# Patient Record
Sex: FEMALE | Race: BLACK OR AFRICAN AMERICAN | Employment: FULL TIME | URBAN - METROPOLITAN AREA
[De-identification: names, ages, dates, MRNs, and addresses within clinical notes are randomized per-mention and may not be internally consistent; named-entity substitution may affect disease eponyms.]

---

## 2018-09-26 ENCOUNTER — HOSPITAL ENCOUNTER (OUTPATIENT)
Dept: MRI IMAGING | Age: 33
Discharge: HOME | End: 2018-09-26
Attending: FAMILY MEDICINE
Payer: COMMERCIAL

## 2018-09-26 ENCOUNTER — HOSPITAL ENCOUNTER (OUTPATIENT)
Dept: GENERAL RADIOLOGY | Age: 33
Discharge: HOME | End: 2018-09-26
Attending: FAMILY MEDICINE
Payer: COMMERCIAL

## 2018-09-26 DIAGNOSIS — M24.111 LABRAL TEAR OF SHOULDER, DEGENERATIVE, RIGHT: ICD-10-CM

## 2018-09-26 DIAGNOSIS — S43.431A LABRAL TEAR OF SHOULDER, RIGHT, INITIAL ENCOUNTER: ICD-10-CM

## 2018-09-26 PROCEDURE — A9575 INJ GADOTERATE MEGLUMI 0.1ML: HCPCS | Performed by: RADIOLOGY

## 2018-09-26 PROCEDURE — 74011000250 HC RX REV CODE- 250: Performed by: RADIOLOGY

## 2018-09-26 PROCEDURE — 73222 MRI JOINT UPR EXTREM W/DYE: CPT

## 2018-09-26 PROCEDURE — 74011250636 HC RX REV CODE- 250/636: Performed by: RADIOLOGY

## 2018-09-26 PROCEDURE — 74011636320 HC RX REV CODE- 636/320: Performed by: RADIOLOGY

## 2018-09-26 PROCEDURE — 77030014113 XR ARTHRO SHOULDER RT

## 2018-09-26 RX ORDER — GADOTERATE MEGLUMINE 376.9 MG/ML
0.1 INJECTION INTRAVENOUS
Status: COMPLETED | OUTPATIENT
Start: 2018-09-26 | End: 2018-09-26

## 2018-09-26 RX ORDER — LIDOCAINE HYDROCHLORIDE 10 MG/ML
3 INJECTION INFILTRATION; PERINEURAL
Status: COMPLETED | OUTPATIENT
Start: 2018-09-26 | End: 2018-09-26

## 2018-09-26 RX ADMIN — LIDOCAINE HYDROCHLORIDE 8 ML: 10 INJECTION, SOLUTION INFILTRATION; PERINEURAL at 11:44

## 2018-09-26 RX ADMIN — GADOTERATE MEGLUMINE 0.1 ML: 376.9 INJECTION INTRAVENOUS at 11:45

## 2018-09-26 RX ADMIN — IOHEXOL 4 ML: 300 INJECTION, SOLUTION INTRAVENOUS at 11:44

## 2018-09-26 RX ADMIN — SODIUM BICARBONATE 1 ML: 0.2 INJECTION, SOLUTION INTRAVENOUS at 11:47

## 2022-07-30 ENCOUNTER — APPOINTMENT (OUTPATIENT)
Dept: ULTRASOUND IMAGING | Age: 37
End: 2022-07-30
Attending: STUDENT IN AN ORGANIZED HEALTH CARE EDUCATION/TRAINING PROGRAM
Payer: COMMERCIAL

## 2022-07-30 ENCOUNTER — HOSPITAL ENCOUNTER (EMERGENCY)
Age: 37
Discharge: HOME | End: 2022-07-30
Attending: STUDENT IN AN ORGANIZED HEALTH CARE EDUCATION/TRAINING PROGRAM
Payer: COMMERCIAL

## 2022-07-30 VITALS
DIASTOLIC BLOOD PRESSURE: 78 MMHG | TEMPERATURE: 98.8 F | HEART RATE: 64 BPM | SYSTOLIC BLOOD PRESSURE: 117 MMHG | RESPIRATION RATE: 18 BRPM | OXYGEN SATURATION: 98 %

## 2022-07-30 DIAGNOSIS — O20.0 THREATENED MISCARRIAGE: Primary | ICD-10-CM

## 2022-07-30 DIAGNOSIS — N30.00 ACUTE CYSTITIS WITHOUT HEMATURIA: ICD-10-CM

## 2022-07-30 LAB
ABO + RH BLD: NORMAL
ALBUMIN SERPL-MCNC: 3.8 G/DL (ref 3.5–5)
ALBUMIN/GLOB SERPL: 1.3 {RATIO} (ref 1.1–2.2)
ALP SERPL-CCNC: 67 U/L (ref 45–117)
ALT SERPL-CCNC: 22 U/L (ref 12–78)
ANION GAP SERPL CALC-SCNC: 4 MMOL/L (ref 5–15)
APPEARANCE UR: ABNORMAL
AST SERPL-CCNC: 10 U/L (ref 15–37)
BACTERIA URNS QL MICRO: ABNORMAL /HPF
BASOPHILS # BLD: 0 K/UL (ref 0–0.1)
BASOPHILS NFR BLD: 0 % (ref 0–1)
BILIRUB SERPL-MCNC: 0.4 MG/DL (ref 0.2–1)
BILIRUB UR QL: NEGATIVE
BLOOD BANK CMNT PATIENT-IMP: NORMAL
BUN SERPL-MCNC: 6 MG/DL (ref 6–20)
BUN/CREAT SERPL: 10 (ref 12–20)
CALCIUM SERPL-MCNC: 9.2 MG/DL (ref 8.5–10.1)
CHLORIDE SERPL-SCNC: 108 MMOL/L (ref 97–108)
CO2 SERPL-SCNC: 28 MMOL/L (ref 21–32)
COLOR UR: ABNORMAL
COMMENT, HOLDF: NORMAL
CREAT SERPL-MCNC: 0.6 MG/DL (ref 0.55–1.02)
DIFFERENTIAL METHOD BLD: NORMAL
EOSINOPHIL # BLD: 0.1 K/UL (ref 0–0.4)
EOSINOPHIL NFR BLD: 1 % (ref 0–7)
EPITH CASTS URNS QL MICRO: ABNORMAL /LPF
ERYTHROCYTE [DISTWIDTH] IN BLOOD BY AUTOMATED COUNT: 11.8 % (ref 11.5–14.5)
GLOBULIN SER CALC-MCNC: 3 G/DL (ref 2–4)
GLUCOSE SERPL-MCNC: 95 MG/DL (ref 65–100)
GLUCOSE UR STRIP.AUTO-MCNC: NEGATIVE MG/DL
HCG SERPL-ACNC: 934 MIU/ML (ref 0–6)
HCT VFR BLD AUTO: 35 % (ref 35–47)
HGB BLD-MCNC: 12.5 G/DL (ref 11.5–16)
HGB UR QL STRIP: ABNORMAL
HYALINE CASTS URNS QL MICRO: ABNORMAL /LPF (ref 0–5)
IMM GRANULOCYTES # BLD AUTO: 0 K/UL (ref 0–0.04)
IMM GRANULOCYTES NFR BLD AUTO: 0 % (ref 0–0.5)
KETONES UR QL STRIP.AUTO: NEGATIVE MG/DL
LEUKOCYTE ESTERASE UR QL STRIP.AUTO: ABNORMAL
LYMPHOCYTES # BLD: 2.1 K/UL (ref 0.8–3.5)
LYMPHOCYTES NFR BLD: 27 % (ref 12–49)
MCH RBC QN AUTO: 32.3 PG (ref 26–34)
MCHC RBC AUTO-ENTMCNC: 35.7 G/DL (ref 30–36.5)
MCV RBC AUTO: 90.4 FL (ref 80–99)
MONOCYTES # BLD: 0.7 K/UL (ref 0–1)
MONOCYTES NFR BLD: 8 % (ref 5–13)
NEUTS SEG # BLD: 5 K/UL (ref 1.8–8)
NEUTS SEG NFR BLD: 64 % (ref 32–75)
NITRITE UR QL STRIP.AUTO: POSITIVE
NRBC # BLD: 0 K/UL (ref 0–0.01)
NRBC BLD-RTO: 0 PER 100 WBC
PH UR STRIP: 7.5 [PH] (ref 5–8)
PLATELET # BLD AUTO: 203 K/UL (ref 150–400)
PMV BLD AUTO: 9.2 FL (ref 8.9–12.9)
POTASSIUM SERPL-SCNC: 3.9 MMOL/L (ref 3.5–5.1)
PROT SERPL-MCNC: 6.8 G/DL (ref 6.4–8.2)
PROT UR STRIP-MCNC: NEGATIVE MG/DL
RBC # BLD AUTO: 3.87 M/UL (ref 3.8–5.2)
RBC #/AREA URNS HPF: ABNORMAL /HPF (ref 0–5)
SAMPLES BEING HELD,HOLD: NORMAL
SODIUM SERPL-SCNC: 140 MMOL/L (ref 136–145)
SP GR UR REFRACTOMETRY: 1.01 (ref 1–1.03)
UR CULT HOLD, URHOLD: NORMAL
UROBILINOGEN UR QL STRIP.AUTO: 0.2 EU/DL (ref 0.2–1)
WBC # BLD AUTO: 7.8 K/UL (ref 3.6–11)
WBC URNS QL MICRO: ABNORMAL /HPF (ref 0–4)

## 2022-07-30 PROCEDURE — 36415 COLL VENOUS BLD VENIPUNCTURE: CPT

## 2022-07-30 PROCEDURE — 76817 TRANSVAGINAL US OBSTETRIC: CPT

## 2022-07-30 PROCEDURE — 99284 EMERGENCY DEPT VISIT MOD MDM: CPT

## 2022-07-30 PROCEDURE — 81001 URINALYSIS AUTO W/SCOPE: CPT

## 2022-07-30 PROCEDURE — 76801 OB US < 14 WKS SINGLE FETUS: CPT

## 2022-07-30 PROCEDURE — 86900 BLOOD TYPING SEROLOGIC ABO: CPT

## 2022-07-30 PROCEDURE — 84702 CHORIONIC GONADOTROPIN TEST: CPT

## 2022-07-30 PROCEDURE — 85025 COMPLETE CBC W/AUTO DIFF WBC: CPT

## 2022-07-30 PROCEDURE — 80053 COMPREHEN METABOLIC PANEL: CPT

## 2022-07-30 RX ORDER — CEPHALEXIN 500 MG/1
500 CAPSULE ORAL 2 TIMES DAILY
Qty: 14 CAPSULE | Refills: 0 | Status: SHIPPED | OUTPATIENT
Start: 2022-07-30 | End: 2022-08-06

## 2022-07-30 RX ORDER — CEPHALEXIN 500 MG/1
500 CAPSULE ORAL 2 TIMES DAILY
Qty: 14 CAPSULE | Refills: 0 | Status: SHIPPED | OUTPATIENT
Start: 2022-07-30 | End: 2022-07-30

## 2022-07-30 NOTE — ED PROVIDER NOTES
Patient is a 40year old female who is G3,P2 who presents to ED c/o vaginal bleeding which started yesterday. Patient reports she was initially seen by her OBGYN, Dr. Weyman Mortimer, on  due to right sided pelvic pain. Her b-hcg was 1141 on . Repeat b-hcg on  1117. Patient developed vaginal bleeding starting yesterday. She was referred to ED by OBGYN to r/o ectopic pregnancy. She denies any abdominal pain, nausea, vomiting, dysuria, urinary frequency, urgency and back pain. Past Medical History:   Diagnosis Date    Abnormal Pap smear     Had bacterial infection, Normal Paps since    Sickle-cell disease, unspecified     Pt has trait, FOB negative       No past surgical history on file. Family History:   Problem Relation Age of Onset    Cancer Paternal Aunt         Breast CA    Heart Disease Maternal Grandfather          from [place]       Social History     Socioeconomic History    Marital status: SINGLE     Spouse name: Not on file    Number of children: Not on file    Years of education: Not on file    Highest education level: Not on file   Occupational History    Not on file   Tobacco Use    Smoking status: Not on file    Smokeless tobacco: Never   Substance and Sexual Activity    Alcohol use: No    Drug use: No    Sexual activity: Not on file   Other Topics Concern    Not on file   Social History Narrative    Not on file     Social Determinants of Health     Financial Resource Strain: Not on file   Food Insecurity: Not on file   Transportation Needs: Not on file   Physical Activity: Not on file   Stress: Not on file   Social Connections: Not on file   Intimate Partner Violence: Not on file   Housing Stability: Not on file         ALLERGIES: Patient has no known allergies. Review of Systems   Constitutional:  Negative for chills and fever. Gastrointestinal:  Negative for abdominal pain, nausea and vomiting. Genitourinary:  Positive for vaginal bleeding.  Negative for difficulty urinating, flank pain, frequency, pelvic pain and urgency. All other systems reviewed and are negative. Vitals:    07/30/22 1126   BP: 117/78   Pulse: 64   Resp: 18   Temp: 98.8 °F (37.1 °C)   SpO2: 98%            Physical Exam  Vitals and nursing note reviewed. Constitutional:       General: She is not in acute distress. Appearance: Normal appearance. She is well-developed. She is not toxic-appearing. HENT:      Head: Normocephalic and atraumatic. Nose: Nose normal.      Mouth/Throat:      Mouth: Mucous membranes are moist.   Eyes:      General: Lids are normal.      Extraocular Movements: Extraocular movements intact. Conjunctiva/sclera: Conjunctivae normal.   Cardiovascular:      Rate and Rhythm: Normal rate and regular rhythm. Pulses: Normal pulses. Heart sounds: Normal heart sounds, S1 normal and S2 normal.   Pulmonary:      Effort: Pulmonary effort is normal. No accessory muscle usage. Breath sounds: Normal breath sounds. Abdominal:      General: Bowel sounds are normal.      Palpations: Abdomen is soft. Tenderness: There is no abdominal tenderness. There is no right CVA tenderness, left CVA tenderness, guarding or rebound. Genitourinary:     Comments: Exam deferred   Musculoskeletal:         General: Normal range of motion. Cervical back: Normal range of motion and neck supple. Skin:     General: Skin is warm and dry. Capillary Refill: Capillary refill takes less than 2 seconds. Neurological:      General: No focal deficit present. Mental Status: She is alert and oriented to person, place, and time. Mental status is at baseline. Psychiatric:         Attention and Perception: Attention normal.         Mood and Affect: Mood and affect normal.         Speech: Speech normal.         Behavior: Behavior is cooperative. Thought Content:  Thought content normal.         Cognition and Memory: Cognition normal.         Judgment: Judgment normal. MDM  Number of Diagnoses or Management Options  Acute cystitis without hematuria  Threatened miscarriage  Diagnosis management comments: Patient with downtrending b-hcg. 7/27 1141, 7/29 1117, 7/30 934. Ultrasound shows probable gestational sac within the endometrial canal, too small for dating. No visualized fetal pole. Blood type O+, rhogam not indicated. Advised follow-up with OBGYN. UA shows leukocyte esterase, positive nitrites, 20-50 WBCs consistent with UTI. Urine culture sent and will treat with keflex. Return to ER warnings discussed in detail with patient. All questions addressed and answered. Amount and/or Complexity of Data Reviewed  Clinical lab tests: reviewed  Tests in the radiology section of CPT®: reviewed  Discuss the patient with other providers: yes (Dr. Korin Meng, ED Attending )      ED Course as of 07/30/22 1805   Sat Jul 30, 2022   1254 TVUS: IMPRESSION  Probable small gestational sac within the endometrial canal, too  small for dating. No visualized fetal pole, likely secondary to early  gestational age. Dominant 2.2 cm right ovarian cyst is likely physiologic. No  pelvic free fluid.  [KG]      ED Course User Index  [KG] Janet Quinonez

## 2022-07-30 NOTE — ED TRIAGE NOTES
Pt arrives for brown vaginal discharge, right side pelvic pain, decreasing serum hcg levels and malodorous urine. Pt was referred by [place] for ultrasound to r/o ectopic pregnancy. .

## 2024-07-10 ENCOUNTER — HOSPITAL ENCOUNTER (EMERGENCY)
Facility: HOSPITAL | Age: 39
Discharge: HOME | End: 2024-07-11
Attending: STUDENT IN AN ORGANIZED HEALTH CARE EDUCATION/TRAINING PROGRAM
Payer: COMMERCIAL

## 2024-07-10 DIAGNOSIS — K29.00 ACUTE GASTRITIS WITHOUT HEMORRHAGE, UNSPECIFIED GASTRITIS TYPE: ICD-10-CM

## 2024-07-10 DIAGNOSIS — R10.13 EPIGASTRIC PAIN: Primary | ICD-10-CM

## 2024-07-10 LAB — HCG UR QL: NEGATIVE

## 2024-07-10 PROCEDURE — 81025 URINE PREGNANCY TEST: CPT

## 2024-07-10 PROCEDURE — 99285 EMERGENCY DEPT VISIT HI MDM: CPT

## 2024-07-10 PROCEDURE — 81001 URINALYSIS AUTO W/SCOPE: CPT

## 2024-07-10 RX ORDER — ASCORBIC ACID 500 MG
500 TABLET ORAL DAILY
COMMUNITY

## 2024-07-10 RX ORDER — ZINC SULFATE 50(220)MG
50 CAPSULE ORAL DAILY
COMMUNITY

## 2024-07-10 ASSESSMENT — PAIN SCALES - GENERAL: PAINLEVEL_OUTOF10: 8

## 2024-07-10 ASSESSMENT — PAIN DESCRIPTION - LOCATION: LOCATION: ABDOMEN

## 2024-07-10 ASSESSMENT — PAIN - FUNCTIONAL ASSESSMENT: PAIN_FUNCTIONAL_ASSESSMENT: 0-10

## 2024-07-10 ASSESSMENT — LIFESTYLE VARIABLES
HOW OFTEN DO YOU HAVE A DRINK CONTAINING ALCOHOL: NEVER
HOW MANY STANDARD DRINKS CONTAINING ALCOHOL DO YOU HAVE ON A TYPICAL DAY: PATIENT DOES NOT DRINK

## 2024-07-11 ENCOUNTER — APPOINTMENT (OUTPATIENT)
Facility: HOSPITAL | Age: 39
End: 2024-07-11
Payer: COMMERCIAL

## 2024-07-11 VITALS
TEMPERATURE: 97.9 F | BODY MASS INDEX: 23.6 KG/M2 | DIASTOLIC BLOOD PRESSURE: 78 MMHG | HEART RATE: 60 BPM | OXYGEN SATURATION: 100 % | RESPIRATION RATE: 17 BRPM | HEIGHT: 61 IN | WEIGHT: 125 LBS | SYSTOLIC BLOOD PRESSURE: 107 MMHG

## 2024-07-11 LAB
ALBUMIN SERPL-MCNC: 4.1 G/DL (ref 3.5–5)
ALBUMIN/GLOB SERPL: 1.5 (ref 1.1–2.2)
ALP SERPL-CCNC: 81 U/L (ref 45–117)
ALT SERPL-CCNC: 35 U/L (ref 12–78)
ANION GAP SERPL CALC-SCNC: 8 MMOL/L (ref 5–15)
APPEARANCE UR: CLEAR
AST SERPL W P-5'-P-CCNC: 37 U/L (ref 15–37)
BACTERIA URNS QL MICRO: NEGATIVE /HPF
BASOPHILS # BLD: 0.1 K/UL (ref 0–0.1)
BASOPHILS NFR BLD: 1 % (ref 0–1)
BILIRUB SERPL-MCNC: 0.4 MG/DL (ref 0.2–1)
BILIRUB UR QL: NEGATIVE
BUN SERPL-MCNC: 7 MG/DL (ref 6–20)
BUN/CREAT SERPL: 9 (ref 12–20)
CA-I BLD-MCNC: 9.1 MG/DL (ref 8.5–10.1)
CHLORIDE SERPL-SCNC: 105 MMOL/L (ref 97–108)
CO2 SERPL-SCNC: 27 MMOL/L (ref 21–32)
COLOR UR: YELLOW
CREAT SERPL-MCNC: 0.81 MG/DL (ref 0.55–1.02)
DIFFERENTIAL METHOD BLD: ABNORMAL
EOSINOPHIL # BLD: 0.1 K/UL (ref 0–0.4)
EOSINOPHIL NFR BLD: 1 % (ref 0–7)
EPITH CASTS URNS QL MICRO: ABNORMAL /LPF
ERYTHROCYTE [DISTWIDTH] IN BLOOD BY AUTOMATED COUNT: 11.2 % (ref 11.5–14.5)
GLOBULIN SER CALC-MCNC: 2.7 G/DL (ref 2–4)
GLUCOSE SERPL-MCNC: 86 MG/DL (ref 65–100)
GLUCOSE UR STRIP.AUTO-MCNC: NEGATIVE MG/DL
HCT VFR BLD AUTO: 36.7 % (ref 35–47)
HGB BLD-MCNC: 13.1 G/DL (ref 11.5–16)
HGB UR QL STRIP: ABNORMAL
IMM GRANULOCYTES # BLD AUTO: 0 K/UL (ref 0–0.04)
IMM GRANULOCYTES NFR BLD AUTO: 0 % (ref 0–0.5)
KETONES UR QL STRIP.AUTO: NEGATIVE MG/DL
LEUKOCYTE ESTERASE UR QL STRIP.AUTO: NEGATIVE
LIPASE SERPL-CCNC: 26 U/L (ref 13–75)
LYMPHOCYTES # BLD: 3.7 K/UL (ref 0.8–3.5)
LYMPHOCYTES NFR BLD: 37 % (ref 12–49)
MCH RBC QN AUTO: 32.1 PG (ref 26–34)
MCHC RBC AUTO-ENTMCNC: 35.7 G/DL (ref 30–36.5)
MCV RBC AUTO: 90 FL (ref 80–99)
MONOCYTES # BLD: 0.9 K/UL (ref 0–1)
MONOCYTES NFR BLD: 9 % (ref 5–13)
NEUTS SEG # BLD: 5.2 K/UL (ref 1.8–8)
NEUTS SEG NFR BLD: 52 % (ref 32–75)
NITRITE UR QL STRIP.AUTO: NEGATIVE
PH UR STRIP: 6.5 (ref 5–8)
PLATELET # BLD AUTO: 205 K/UL (ref 150–400)
PMV BLD AUTO: 9.3 FL (ref 8.9–12.9)
POTASSIUM SERPL-SCNC: 3.3 MMOL/L (ref 3.5–5.1)
PROT SERPL-MCNC: 6.8 G/DL (ref 6.4–8.2)
PROT UR STRIP-MCNC: NEGATIVE MG/DL
RBC # BLD AUTO: 4.08 M/UL (ref 3.8–5.2)
RBC #/AREA URNS HPF: ABNORMAL /HPF (ref 0–5)
SODIUM SERPL-SCNC: 140 MMOL/L (ref 136–145)
SP GR UR REFRACTOMETRY: 1.01 (ref 1–1.03)
UROBILINOGEN UR QL STRIP.AUTO: 0.1 EU/DL (ref 0.2–1)
WBC # BLD AUTO: 10 K/UL (ref 3.6–11)
WBC URNS QL MICRO: ABNORMAL /HPF (ref 0–4)

## 2024-07-11 PROCEDURE — 83690 ASSAY OF LIPASE: CPT

## 2024-07-11 PROCEDURE — 85025 COMPLETE CBC W/AUTO DIFF WBC: CPT

## 2024-07-11 PROCEDURE — 6360000002 HC RX W HCPCS: Performed by: STUDENT IN AN ORGANIZED HEALTH CARE EDUCATION/TRAINING PROGRAM

## 2024-07-11 PROCEDURE — 80053 COMPREHEN METABOLIC PANEL: CPT

## 2024-07-11 PROCEDURE — 74177 CT ABD & PELVIS W/CONTRAST: CPT

## 2024-07-11 PROCEDURE — 96375 TX/PRO/DX INJ NEW DRUG ADDON: CPT

## 2024-07-11 PROCEDURE — 2580000003 HC RX 258: Performed by: STUDENT IN AN ORGANIZED HEALTH CARE EDUCATION/TRAINING PROGRAM

## 2024-07-11 PROCEDURE — 6360000004 HC RX CONTRAST MEDICATION: Performed by: STUDENT IN AN ORGANIZED HEALTH CARE EDUCATION/TRAINING PROGRAM

## 2024-07-11 PROCEDURE — 96374 THER/PROPH/DIAG INJ IV PUSH: CPT

## 2024-07-11 PROCEDURE — 36415 COLL VENOUS BLD VENIPUNCTURE: CPT

## 2024-07-11 PROCEDURE — 96361 HYDRATE IV INFUSION ADD-ON: CPT

## 2024-07-11 RX ORDER — ONDANSETRON 2 MG/ML
4 INJECTION INTRAMUSCULAR; INTRAVENOUS ONCE
Status: COMPLETED | OUTPATIENT
Start: 2024-07-11 | End: 2024-07-11

## 2024-07-11 RX ORDER — 0.9 % SODIUM CHLORIDE 0.9 %
1000 INTRAVENOUS SOLUTION INTRAVENOUS ONCE
Status: COMPLETED | OUTPATIENT
Start: 2024-07-11 | End: 2024-07-11

## 2024-07-11 RX ORDER — DICYCLOMINE HCL 20 MG
20 TABLET ORAL 4 TIMES DAILY
Qty: 30 TABLET | Refills: 0 | Status: SHIPPED | OUTPATIENT
Start: 2024-07-11

## 2024-07-11 RX ORDER — MORPHINE SULFATE 4 MG/ML
4 INJECTION, SOLUTION INTRAMUSCULAR; INTRAVENOUS
Status: COMPLETED | OUTPATIENT
Start: 2024-07-11 | End: 2024-07-11

## 2024-07-11 RX ORDER — ONDANSETRON 4 MG/1
4 TABLET, ORALLY DISINTEGRATING ORAL 3 TIMES DAILY PRN
Qty: 10 TABLET | Refills: 0 | Status: SHIPPED | OUTPATIENT
Start: 2024-07-11

## 2024-07-11 RX ADMIN — IOPAMIDOL 100 ML: 755 INJECTION, SOLUTION INTRAVENOUS at 01:44

## 2024-07-11 RX ADMIN — SODIUM CHLORIDE 1000 ML: 9 INJECTION, SOLUTION INTRAVENOUS at 01:06

## 2024-07-11 RX ADMIN — ONDANSETRON 4 MG: 2 INJECTION INTRAMUSCULAR; INTRAVENOUS at 01:06

## 2024-07-11 RX ADMIN — MORPHINE SULFATE 4 MG: 4 INJECTION, SOLUTION INTRAMUSCULAR; INTRAVENOUS at 01:06

## 2024-07-11 ASSESSMENT — PAIN DESCRIPTION - ORIENTATION: ORIENTATION: MID

## 2024-07-11 ASSESSMENT — PAIN SCALES - GENERAL
PAINLEVEL_OUTOF10: 2
PAINLEVEL_OUTOF10: 2

## 2024-07-11 ASSESSMENT — PAIN DESCRIPTION - LOCATION: LOCATION: ABDOMEN

## 2024-07-11 NOTE — ED TRIAGE NOTES
Pt reports midline epigastric pain onset 7 hours ago, denies melena or constipation, denies diarrhea, denies vaginal discharge or bleeding, denies dysuria

## 2024-07-11 NOTE — ED PROVIDER NOTES
elemental zinc  Commonly known as: ZINCATE               Where to Get Your Medications        These medications were sent to [id number] - [place], [place] - [address] - P [phone] - F [phone] [address] [place] [address]      Phone: [phone]   dicyclomine 20 MG tablet  ondansetron 4 MG disintegrating tablet           DISCONTINUED MEDICATIONS:  Current Discharge Medication List        STOP taking these medications       oxyCODONE-acetaminophen (PERCOCET) 5-325 MG per tablet Comments:   Reason for Stopping:               I am the Primary Clinician of Record: Jv Forrester MD (electronically signed)    (Please note that parts of this dictation were completed with voice recognition software. Quite often unanticipated grammatical, syntax, homophones, and other interpretive errors are inadvertently transcribed by the computer software. Please disregards these errors. Please excuse any errors that have escaped final proofreading.)     Jv Forrester MD  07/11/24 0250

## 2024-08-29 LAB — GBS, EXTERNAL RESULT: POSITIVE

## 2024-09-06 LAB
C. TRACHOMATIS, EXTERNAL RESULT: NEGATIVE
HEP B, EXTERNAL RESULT: NEGATIVE
HIV, EXTERNAL RESULT: NEGATIVE
N. GONORRHOEAE, EXTERNAL RESULT: NEGATIVE
RPR, EXTERNAL RESULT: NON REACTIVE
RUBELLA TITER, EXTERNAL RESULT: NORMAL

## 2024-11-28 ENCOUNTER — APPOINTMENT (OUTPATIENT)
Facility: HOSPITAL | Age: 39
End: 2024-11-28
Payer: COMMERCIAL

## 2024-11-28 ENCOUNTER — HOSPITAL ENCOUNTER (EMERGENCY)
Facility: HOSPITAL | Age: 39
Discharge: HOME | End: 2024-11-28
Attending: STUDENT IN AN ORGANIZED HEALTH CARE EDUCATION/TRAINING PROGRAM
Payer: COMMERCIAL

## 2024-11-28 VITALS
SYSTOLIC BLOOD PRESSURE: 113 MMHG | OXYGEN SATURATION: 100 % | RESPIRATION RATE: 18 BRPM | HEART RATE: 65 BPM | WEIGHT: 155.2 LBS | BODY MASS INDEX: 29.3 KG/M2 | DIASTOLIC BLOOD PRESSURE: 77 MMHG | HEIGHT: 61 IN | TEMPERATURE: 98 F

## 2024-11-28 DIAGNOSIS — O46.92 VAGINAL BLEEDING IN PREGNANCY, SECOND TRIMESTER: Primary | ICD-10-CM

## 2024-11-28 DIAGNOSIS — N30.00 ACUTE CYSTITIS WITHOUT HEMATURIA: ICD-10-CM

## 2024-11-28 LAB
ABDOMINAL CIRCUMFERENCE: 13.41 CM
ABDOMINAL CIRCUMFERENCE: 13.41 CM
ABO + RH BLD: NORMAL
ALBUMIN SERPL-MCNC: 3.5 G/DL (ref 3.5–5)
ALBUMIN/GLOB SERPL: 0.9 (ref 1.1–2.2)
ALP SERPL-CCNC: 66 U/L (ref 45–117)
ALT SERPL-CCNC: 24 U/L (ref 12–78)
ANION GAP SERPL CALC-SCNC: 8 MMOL/L (ref 2–12)
APPEARANCE UR: CLEAR
AST SERPL-CCNC: 20 U/L (ref 15–37)
BACTERIA URNS QL MICRO: NEGATIVE /HPF
BASOPHILS # BLD: 0 K/UL (ref 0–0.1)
BASOPHILS NFR BLD: 0 % (ref 0–1)
BILIRUB SERPL-MCNC: 0.5 MG/DL (ref 0.2–1)
BILIRUB UR QL: NEGATIVE
BIPARIETAL DIAMETER: 4.35 CM
BIPARIETAL DIAMETER: 4.35 CM
BUN SERPL-MCNC: 8 MG/DL (ref 6–20)
BUN/CREAT SERPL: 15 (ref 12–20)
CALCIUM SERPL-MCNC: 9.2 MG/DL (ref 8.5–10.1)
CHLORIDE SERPL-SCNC: 106 MMOL/L (ref 97–108)
CO2 SERPL-SCNC: 23 MMOL/L (ref 21–32)
COLOR UR: ABNORMAL
CREAT SERPL-MCNC: 0.54 MG/DL (ref 0.55–1.02)
DIFFERENTIAL METHOD BLD: ABNORMAL
EOSINOPHIL # BLD: 0.1 K/UL (ref 0–0.4)
EOSINOPHIL NFR BLD: 0 % (ref 0–7)
EPITH CASTS URNS QL MICRO: ABNORMAL /LPF
ERYTHROCYTE [DISTWIDTH] IN BLOOD BY AUTOMATED COUNT: 11.6 % (ref 11.5–14.5)
GLOBULIN SER CALC-MCNC: 3.7 G/DL (ref 2–4)
GLUCOSE SERPL-MCNC: 83 MG/DL (ref 65–100)
GLUCOSE UR STRIP.AUTO-MCNC: NEGATIVE MG/DL
HCT VFR BLD AUTO: 35.6 % (ref 35–47)
HEAD CIRCUMFERENCE: 16.16 CM
HEAD CIRCUMFERENCE: 16.16 CM
HGB BLD-MCNC: 12.6 G/DL (ref 11.5–16)
HGB UR QL STRIP: ABNORMAL
HYALINE CASTS URNS QL MICRO: ABNORMAL /LPF (ref 0–5)
IMM GRANULOCYTES # BLD AUTO: 0.1 K/UL (ref 0–0.04)
IMM GRANULOCYTES NFR BLD AUTO: 0 % (ref 0–0.5)
KETONES UR QL STRIP.AUTO: NEGATIVE MG/DL
LEUKOCYTE ESTERASE UR QL STRIP.AUTO: ABNORMAL
LYMPHOCYTES # BLD: 2.5 K/UL (ref 0.8–3.5)
LYMPHOCYTES NFR BLD: 17 % (ref 12–49)
MCH RBC QN AUTO: 32.2 PG (ref 26–34)
MCHC RBC AUTO-ENTMCNC: 35.4 G/DL (ref 30–36.5)
MCV RBC AUTO: 91 FL (ref 80–99)
MONOCYTES # BLD: 1.2 K/UL (ref 0–1)
MONOCYTES NFR BLD: 8 % (ref 5–13)
NEUTS SEG # BLD: 10.8 K/UL (ref 1.8–8)
NEUTS SEG NFR BLD: 75 % (ref 32–75)
NITRITE UR QL STRIP.AUTO: NEGATIVE
NRBC # BLD: 0 K/UL (ref 0–0.01)
NRBC BLD-RTO: 0 PER 100 WBC
PH UR STRIP: 6 (ref 5–8)
PLATELET # BLD AUTO: 206 K/UL (ref 150–400)
PMV BLD AUTO: 9.7 FL (ref 8.9–12.9)
POTASSIUM SERPL-SCNC: 3.7 MMOL/L (ref 3.5–5.1)
PROT SERPL-MCNC: 7.2 G/DL (ref 6.4–8.2)
PROT UR STRIP-MCNC: NEGATIVE MG/DL
RBC # BLD AUTO: 3.91 M/UL (ref 3.8–5.2)
RBC #/AREA URNS HPF: ABNORMAL /HPF (ref 0–5)
SODIUM SERPL-SCNC: 137 MMOL/L (ref 136–145)
SP GR UR REFRACTOMETRY: 1 (ref 1–1.03)
SPECIMEN HOLD: NORMAL
UROBILINOGEN UR QL STRIP.AUTO: 0.2 EU/DL (ref 0.2–1)
WBC # BLD AUTO: 14.7 K/UL (ref 3.6–11)
WBC URNS QL MICRO: ABNORMAL /HPF (ref 0–4)

## 2024-11-28 PROCEDURE — 76815 OB US LIMITED FETUS(S): CPT

## 2024-11-28 PROCEDURE — 81001 URINALYSIS AUTO W/SCOPE: CPT

## 2024-11-28 PROCEDURE — 87086 URINE CULTURE/COLONY COUNT: CPT

## 2024-11-28 PROCEDURE — 86901 BLOOD TYPING SEROLOGIC RH(D): CPT

## 2024-11-28 PROCEDURE — 85025 COMPLETE CBC W/AUTO DIFF WBC: CPT

## 2024-11-28 PROCEDURE — 80053 COMPREHEN METABOLIC PANEL: CPT

## 2024-11-28 PROCEDURE — 36415 COLL VENOUS BLD VENIPUNCTURE: CPT

## 2024-11-28 PROCEDURE — 6370000000 HC RX 637 (ALT 250 FOR IP): Performed by: STUDENT IN AN ORGANIZED HEALTH CARE EDUCATION/TRAINING PROGRAM

## 2024-11-28 PROCEDURE — 99284 EMERGENCY DEPT VISIT MOD MDM: CPT

## 2024-11-28 PROCEDURE — 86900 BLOOD TYPING SEROLOGIC ABO: CPT

## 2024-11-28 RX ORDER — CEPHALEXIN 500 MG/1
500 CAPSULE ORAL
Status: COMPLETED | OUTPATIENT
Start: 2024-11-28 | End: 2024-11-28

## 2024-11-28 RX ORDER — CEPHALEXIN 500 MG/1
500 CAPSULE ORAL 4 TIMES DAILY
Qty: 20 CAPSULE | Refills: 0 | Status: SHIPPED | OUTPATIENT
Start: 2024-11-28 | End: 2024-11-28

## 2024-11-28 RX ORDER — CEPHALEXIN 500 MG/1
500 CAPSULE ORAL 4 TIMES DAILY
Qty: 20 CAPSULE | Refills: 0 | Status: SHIPPED | OUTPATIENT
Start: 2024-11-28 | End: 2024-12-03

## 2024-11-28 RX ADMIN — CEPHALEXIN 500 MG: 500 CAPSULE ORAL at 18:49

## 2024-11-28 ASSESSMENT — PAIN SCALES - GENERAL: PAINLEVEL_OUTOF10: 0

## 2024-11-28 NOTE — ED TRIAGE NOTES
Pt is 19w6d pregnant. Started having vaginal bleeding and lower abd cramping about 1 hour ago. Denies pain at this time. Has pain with urination

## 2024-11-28 NOTE — DISCHARGE INSTRUCTIONS
Your workup today was reassuring. Recommend pelvic rest. Schedule an appointment for close follow-up with Dr. Morin. If you develop any new concerning symptoms, or increase in vaginal bleeding - return to ER.

## 2024-11-28 NOTE — ED PROVIDER NOTES
[facility] EMERGENCY DEP  EMERGENCY DEPARTMENT ENCOUNTER      Pt Name: Cari Singh  MRN: [id number]  Birthdate 1985  Date of evaluation: 2024  Provider: RUBIO Nicholson    CHIEF COMPLAINT       Chief Complaint   Patient presents with    Bleeding During Pregnancy         HISTORY OF PRESENT ILLNESS    Patient is a 38 yo female who presents to ED c/o vaginal bleeding in pregnancy. Reports she urinated and noticed bright red blood at 2 pm. States she has saturated a panty liner with bright red blood since onset. Also reports painful urination and very minimal pelvic cramping. Denies similar symptoms previously. She is currently 19 weeks 6 days gestation, followed by Dr. Morin. She was seen by her OB 3 days prior. Denies any fever, chills, dysuria, flank pain, nausea, vomiting, syncope.             Review of External Medical Records:     Nursing Notes were reviewed.    REVIEW OF SYSTEMS       Review of Systems   All other systems reviewed and are negative.      Except as noted above the remainder of the review of systems was reviewed and negative.       PAST MEDICAL HISTORY     Past Medical History:   Diagnosis Date    Abnormal Pap smear     Had bacterial infection, Normal Paps since    Sickle-cell disease, unspecified     Pt has trait, FOB negative         SURGICAL HISTORY       Past Surgical History:   Procedure Laterality Date    DILATION AND CURETTAGE OF UTERUS      post spontaneous          CURRENT MEDICATIONS       Discharge Medication List as of 2024  6:45 PM        CONTINUE these medications which have NOT CHANGED    Details   ondansetron (ZOFRAN-ODT) 4 MG disintegrating tablet Take 1 tablet by mouth 3 times daily as needed for Nausea or Vomiting, Disp-10 tablet, R-0Normal      dicyclomine (BENTYL) 20 MG tablet Take 1 tablet by mouth 4 times daily, Disp-30 tablet, R-0Normal      zinc sulfate (ZINCATE) 220 (50 Zn)  mg capsule - elemental zinc Take 1 capsule by mouth

## 2024-11-29 LAB
BACTERIA SPEC CULT: NORMAL
SERVICE CMNT-IMP: NORMAL

## 2025-03-27 ENCOUNTER — HOSPITAL ENCOUNTER (OUTPATIENT)
Facility: HOSPITAL | Age: 40
Discharge: HOME | DRG: 540 | End: 2025-03-27
Attending: OBSTETRICS & GYNECOLOGY | Admitting: OBSTETRICS & GYNECOLOGY
Payer: COMMERCIAL

## 2025-03-27 VITALS
RESPIRATION RATE: 16 BRPM | TEMPERATURE: 97.5 F | HEART RATE: 87 BPM | SYSTOLIC BLOOD PRESSURE: 113 MMHG | DIASTOLIC BLOOD PRESSURE: 75 MMHG | BODY MASS INDEX: 33.23 KG/M2 | WEIGHT: 176 LBS | HEIGHT: 61 IN

## 2025-03-27 LAB
ERYTHROCYTE [DISTWIDTH] IN BLOOD BY AUTOMATED COUNT: 11.9 % (ref 11.5–14.5)
HCT VFR BLD AUTO: 31.9 % (ref 35–47)
HGB BLD-MCNC: 11.1 G/DL (ref 11.5–16)
MCH RBC QN AUTO: 31.2 PG (ref 26–34)
MCHC RBC AUTO-ENTMCNC: 34.8 G/DL (ref 30–36.5)
MCV RBC AUTO: 89.6 FL (ref 80–99)
NRBC # BLD: 0 K/UL (ref 0–0.01)
NRBC BLD-RTO: 0 PER 100 WBC
PLATELET # BLD AUTO: 185 K/UL (ref 150–400)
PMV BLD AUTO: 9.9 FL (ref 8.9–12.9)
RBC # BLD AUTO: 3.56 M/UL (ref 3.8–5.2)
RPR SER QL: NONREACTIVE
WBC # BLD AUTO: 10.2 K/UL (ref 3.6–11)

## 2025-03-27 PROCEDURE — 86901 BLOOD TYPING SEROLOGIC RH(D): CPT

## 2025-03-27 PROCEDURE — 86900 BLOOD TYPING SEROLOGIC ABO: CPT

## 2025-03-27 PROCEDURE — 85027 COMPLETE CBC AUTOMATED: CPT

## 2025-03-27 PROCEDURE — 86592 SYPHILIS TEST NON-TREP QUAL: CPT

## 2025-03-27 PROCEDURE — 86850 RBC ANTIBODY SCREEN: CPT

## 2025-03-27 RX ORDER — THIAMINE MONONITRATE (VIT B1) 100 MG
100 TABLET ORAL DAILY
Status: ON HOLD | COMMUNITY
End: 2025-03-30 | Stop reason: HOSPADM

## 2025-03-27 NOTE — H&P
History & Physical    Name: Cari Singh MRN: [id number]  SSN: [id number]    YOB: 1985  Age: 39 y.o.  Sex: female      Subjective:     Chief Complaint:  Pregnancy and  section    Estimated Date of Delivery: 25  OB History    Para Term  AB Living   4 2   1 2   SAB IAB Ectopic Molar Multiple Live Births   1           # Outcome Date GA Lbr Price/2nd Weight Sex Type Anes PTL Lv   4 Current            3 Para            2 Para            1 SAB                Ms. Singh admitted with pregnancy at 37w0d for  section due to placenta previa and transverse presentation.    Prenatal course is notable for:  -AMA: normal NIPT  -H/o LEEP: normal CL  -GBS bacteruria   -Posterior placenta previa    EFW 41st%ile, BPP 8/8. 36 weeks.     Please see prenatal records which have also been sent to Labor and Delivery and added to Epic for details.    Past Medical History:   Diagnosis Date    Abnormal Pap smear     Had bacterial infection, Normal Paps since    Sickle-cell disease, unspecified     Pt has trait, FOB negative     Past Surgical History:   Procedure Laterality Date    DILATION AND CURETTAGE OF UTERUS      post spontaneous     WISDOM TOOTH EXTRACTION       Social History     Occupational History    Not on file   Tobacco Use    Smoking status: Never    Smokeless tobacco: Never   Vaping Use    Vaping status: Never Used   Substance and Sexual Activity    Alcohol use: No    Drug use: Not Currently    Sexual activity: Not on file     Family History   Problem Relation Age of Onset    Cancer Paternal Aunt         Breast CA    Heart Disease Maternal Grandfather          from [place]       No Known Allergies  Prior to Admission medications    Medication Sig Start Date End Date Taking? Authorizing Provider   vitamin B-1 (THIAMINE) 100 MG tablet Take 1 tablet by mouth daily   Yes ProviderNico MD   Prenatal Multivit-Min-Fe-FA (PRENATAL 1 + IRON PO) Take by mouth

## 2025-03-27 NOTE — PROGRESS NOTES
Patient here for Pre-Admission Testing (PAT) for scheduled  section. PAT packet reviewed with the patient. Labs drawn and sent. MAGI wipes and preventing surgical site infection education given to the patient. Education also provided to be NPO after 7am and to arrive for scheduled procedure at 8am on 3/28/25. Patient verbalizes understanding and sent home with PAT packet for further review.     Patient instructed to take no medication(s) on the morning of her scheduled procedure.

## 2025-03-28 ENCOUNTER — HOSPITAL ENCOUNTER (INPATIENT)
Facility: HOSPITAL | Age: 40
LOS: 2 days | Discharge: HOME | DRG: 540 | End: 2025-03-30
Attending: OBSTETRICS & GYNECOLOGY | Admitting: OBSTETRICS & GYNECOLOGY
Payer: COMMERCIAL

## 2025-03-28 ENCOUNTER — ANESTHESIA EVENT (OUTPATIENT)
Facility: HOSPITAL | Age: 40
End: 2025-03-28
Payer: COMMERCIAL

## 2025-03-28 ENCOUNTER — ANESTHESIA (OUTPATIENT)
Facility: HOSPITAL | Age: 40
End: 2025-03-28
Payer: COMMERCIAL

## 2025-03-28 DIAGNOSIS — Z98.891 S/P CESAREAN SECTION: Primary | ICD-10-CM

## 2025-03-28 PROBLEM — Z3A.37 37 WEEKS GESTATION OF PREGNANCY: Status: ACTIVE | Noted: 2025-03-28

## 2025-03-28 LAB
ABO + RH BLD: NORMAL
BASOPHILS # BLD: 0.03 K/UL (ref 0–0.1)
BASOPHILS NFR BLD: 0.3 % (ref 0–1)
BLOOD GROUP ANTIBODIES SERPL: NORMAL
DIFFERENTIAL METHOD BLD: ABNORMAL
EOSINOPHIL # BLD: 0.08 K/UL (ref 0–0.4)
EOSINOPHIL NFR BLD: 0.7 % (ref 0–7)
ERYTHROCYTE [DISTWIDTH] IN BLOOD BY AUTOMATED COUNT: 12 % (ref 11.5–14.5)
HCT VFR BLD AUTO: 33.2 % (ref 35–47)
HGB BLD-MCNC: 11.5 G/DL (ref 11.5–16)
HISTORY CHECK: NORMAL
IMM GRANULOCYTES # BLD AUTO: 0.11 K/UL (ref 0–0.04)
IMM GRANULOCYTES NFR BLD AUTO: 1 % (ref 0–0.5)
LYMPHOCYTES # BLD: 1.95 K/UL (ref 0.8–3.5)
LYMPHOCYTES NFR BLD: 17.2 % (ref 12–49)
MCH RBC QN AUTO: 31.3 PG (ref 26–34)
MCHC RBC AUTO-ENTMCNC: 34.6 G/DL (ref 30–36.5)
MCV RBC AUTO: 90.5 FL (ref 80–99)
MONOCYTES # BLD: 1.46 K/UL (ref 0–1)
MONOCYTES NFR BLD: 12.9 % (ref 5–13)
NEUTS SEG # BLD: 7.71 K/UL (ref 1.8–8)
NEUTS SEG NFR BLD: 67.9 % (ref 32–75)
NRBC # BLD: 0 K/UL (ref 0–0.01)
NRBC BLD-RTO: 0 PER 100 WBC
PLATELET # BLD AUTO: 202 K/UL (ref 150–400)
PMV BLD AUTO: 9.7 FL (ref 8.9–12.9)
RBC # BLD AUTO: 3.67 M/UL (ref 3.8–5.2)
SPECIMEN EXP DATE BLD: NORMAL
WBC # BLD AUTO: 11.3 K/UL (ref 3.6–11)

## 2025-03-28 PROCEDURE — 7100000001 HC PACU RECOVERY - ADDTL 15 MIN: Performed by: OBSTETRICS & GYNECOLOGY

## 2025-03-28 PROCEDURE — 7100000000 HC PACU RECOVERY - FIRST 15 MIN: Performed by: OBSTETRICS & GYNECOLOGY

## 2025-03-28 PROCEDURE — 85025 COMPLETE CBC W/AUTO DIFF WBC: CPT

## 2025-03-28 PROCEDURE — P9045 ALBUMIN (HUMAN), 5%, 250 ML: HCPCS

## 2025-03-28 PROCEDURE — 6360000002 HC RX W HCPCS: Performed by: OBSTETRICS & GYNECOLOGY

## 2025-03-28 PROCEDURE — 1120000000 HC RM PRIVATE OB

## 2025-03-28 PROCEDURE — 6360000002 HC RX W HCPCS: Performed by: ANESTHESIOLOGY

## 2025-03-28 PROCEDURE — 2580000003 HC RX 258

## 2025-03-28 PROCEDURE — 2709999900 HC NON-CHARGEABLE SUPPLY: Performed by: OBSTETRICS & GYNECOLOGY

## 2025-03-28 PROCEDURE — 2500000003 HC RX 250 WO HCPCS

## 2025-03-28 PROCEDURE — 6360000002 HC RX W HCPCS

## 2025-03-28 PROCEDURE — 99465 NB RESUSCITATION: CPT

## 2025-03-28 PROCEDURE — 2500000003 HC RX 250 WO HCPCS: Performed by: OBSTETRICS & GYNECOLOGY

## 2025-03-28 PROCEDURE — 6370000000 HC RX 637 (ALT 250 FOR IP): Performed by: OBSTETRICS & GYNECOLOGY

## 2025-03-28 PROCEDURE — 3700000001 HC ADD 15 MINUTES (ANESTHESIA): Performed by: OBSTETRICS & GYNECOLOGY

## 2025-03-28 PROCEDURE — 3700000000 HC ANESTHESIA ATTENDED CARE: Performed by: OBSTETRICS & GYNECOLOGY

## 2025-03-28 PROCEDURE — 3609079900 HC CESAREAN SECTION: Performed by: OBSTETRICS & GYNECOLOGY

## 2025-03-28 PROCEDURE — 2580000003 HC RX 258: Performed by: OBSTETRICS & GYNECOLOGY

## 2025-03-28 RX ORDER — SIMETHICONE 80 MG
80 TABLET,CHEWABLE ORAL EVERY 6 HOURS PRN
Status: DISCONTINUED | OUTPATIENT
Start: 2025-03-28 | End: 2025-03-30 | Stop reason: HOSPADM

## 2025-03-28 RX ORDER — FENTANYL CITRATE 50 UG/ML
INJECTION, SOLUTION INTRAMUSCULAR; INTRAVENOUS
Status: DISCONTINUED | OUTPATIENT
Start: 2025-03-28 | End: 2025-03-28 | Stop reason: SDUPTHER

## 2025-03-28 RX ORDER — NITROGLYCERIN 20 MG/100ML
INJECTION INTRAVENOUS
Status: DISCONTINUED | OUTPATIENT
Start: 2025-03-28 | End: 2025-03-28 | Stop reason: SDUPTHER

## 2025-03-28 RX ORDER — OXYCODONE HYDROCHLORIDE 5 MG/1
5 TABLET ORAL EVERY 4 HOURS PRN
Status: DISCONTINUED | OUTPATIENT
Start: 2025-03-29 | End: 2025-03-30 | Stop reason: HOSPADM

## 2025-03-28 RX ORDER — MISOPROSTOL 100 UG/1
TABLET ORAL PRN
Status: DISCONTINUED | OUTPATIENT
Start: 2025-03-28 | End: 2025-03-28 | Stop reason: ALTCHOICE

## 2025-03-28 RX ORDER — ONDANSETRON 2 MG/ML
4 INJECTION INTRAMUSCULAR; INTRAVENOUS EVERY 6 HOURS PRN
Status: DISCONTINUED | OUTPATIENT
Start: 2025-03-28 | End: 2025-03-30 | Stop reason: HOSPADM

## 2025-03-28 RX ORDER — DEXMEDETOMIDINE HYDROCHLORIDE 100 UG/ML
INJECTION, SOLUTION INTRAVENOUS
Status: DISCONTINUED | OUTPATIENT
Start: 2025-03-28 | End: 2025-03-28 | Stop reason: SDUPTHER

## 2025-03-28 RX ORDER — MODIFIED LANOLIN
OINTMENT (GRAM) TOPICAL
Status: DISCONTINUED | OUTPATIENT
Start: 2025-03-28 | End: 2025-03-30 | Stop reason: HOSPADM

## 2025-03-28 RX ORDER — SODIUM CHLORIDE, SODIUM LACTATE, POTASSIUM CHLORIDE, AND CALCIUM CHLORIDE .6; .31; .03; .02 G/100ML; G/100ML; G/100ML; G/100ML
1000 INJECTION, SOLUTION INTRAVENOUS ONCE
Status: COMPLETED | OUTPATIENT
Start: 2025-03-28 | End: 2025-03-28

## 2025-03-28 RX ORDER — DIPHENHYDRAMINE HCL 25 MG
25 CAPSULE ORAL EVERY 6 HOURS PRN
Status: DISCONTINUED | OUTPATIENT
Start: 2025-03-28 | End: 2025-03-30 | Stop reason: HOSPADM

## 2025-03-28 RX ORDER — DOCUSATE SODIUM 100 MG/1
100 CAPSULE, LIQUID FILLED ORAL 2 TIMES DAILY PRN
Status: DISCONTINUED | OUTPATIENT
Start: 2025-03-28 | End: 2025-03-30 | Stop reason: HOSPADM

## 2025-03-28 RX ORDER — ONDANSETRON 2 MG/ML
INJECTION INTRAMUSCULAR; INTRAVENOUS
Status: DISCONTINUED | OUTPATIENT
Start: 2025-03-28 | End: 2025-03-28

## 2025-03-28 RX ORDER — KETOROLAC TROMETHAMINE 30 MG/ML
INJECTION, SOLUTION INTRAMUSCULAR; INTRAVENOUS
Status: DISCONTINUED | OUTPATIENT
Start: 2025-03-28 | End: 2025-03-28 | Stop reason: SDUPTHER

## 2025-03-28 RX ORDER — OXYCODONE HYDROCHLORIDE 5 MG/1
10 TABLET ORAL EVERY 4 HOURS PRN
Status: DISCONTINUED | OUTPATIENT
Start: 2025-03-29 | End: 2025-03-30 | Stop reason: HOSPADM

## 2025-03-28 RX ORDER — MORPHINE SULFATE 1 MG/ML
INJECTION, SOLUTION EPIDURAL; INTRATHECAL; INTRAVENOUS
Status: COMPLETED | OUTPATIENT
Start: 2025-03-28 | End: 2025-03-28

## 2025-03-28 RX ORDER — NALBUPHINE HYDROCHLORIDE 10 MG/ML
5 INJECTION INTRAMUSCULAR; INTRAVENOUS; SUBCUTANEOUS EVERY 4 HOURS PRN
Status: ACTIVE | OUTPATIENT
Start: 2025-03-28 | End: 2025-03-29

## 2025-03-28 RX ORDER — SODIUM CHLORIDE 9 MG/ML
INJECTION, SOLUTION INTRAVENOUS PRN
Status: DISCONTINUED | OUTPATIENT
Start: 2025-03-28 | End: 2025-03-30 | Stop reason: HOSPADM

## 2025-03-28 RX ORDER — ALBUMIN HUMAN 50 G/1000ML
SOLUTION INTRAVENOUS
Status: DISCONTINUED | OUTPATIENT
Start: 2025-03-28 | End: 2025-03-28 | Stop reason: SDUPTHER

## 2025-03-28 RX ORDER — SODIUM CHLORIDE, SODIUM LACTATE, POTASSIUM CHLORIDE, CALCIUM CHLORIDE 600; 310; 30; 20 MG/100ML; MG/100ML; MG/100ML; MG/100ML
INJECTION, SOLUTION INTRAVENOUS CONTINUOUS
Status: DISCONTINUED | OUTPATIENT
Start: 2025-03-28 | End: 2025-03-28 | Stop reason: SDUPTHER

## 2025-03-28 RX ORDER — SODIUM CHLORIDE 0.9 % (FLUSH) 0.9 %
10 SYRINGE (ML) INJECTION PRN
Status: DISCONTINUED | OUTPATIENT
Start: 2025-03-28 | End: 2025-03-28 | Stop reason: SDUPTHER

## 2025-03-28 RX ORDER — POLYETHYLENE GLYCOL 3350 17 G/17G
17 POWDER, FOR SOLUTION ORAL DAILY PRN
Status: DISCONTINUED | OUTPATIENT
Start: 2025-03-28 | End: 2025-03-30 | Stop reason: HOSPADM

## 2025-03-28 RX ORDER — KETOROLAC TROMETHAMINE 30 MG/ML
30 INJECTION, SOLUTION INTRAMUSCULAR; INTRAVENOUS EVERY 6 HOURS
Status: COMPLETED | OUTPATIENT
Start: 2025-03-28 | End: 2025-03-29

## 2025-03-28 RX ORDER — SWAB
1 SWAB, NON-MEDICATED MISCELLANEOUS DAILY
Status: DISCONTINUED | OUTPATIENT
Start: 2025-03-28 | End: 2025-03-30 | Stop reason: HOSPADM

## 2025-03-28 RX ORDER — SODIUM CHLORIDE 9 MG/ML
INJECTION, SOLUTION INTRAVENOUS PRN
Status: DISCONTINUED | OUTPATIENT
Start: 2025-03-28 | End: 2025-03-28 | Stop reason: SDUPTHER

## 2025-03-28 RX ORDER — ONDANSETRON 4 MG/1
4 TABLET, ORALLY DISINTEGRATING ORAL EVERY 8 HOURS PRN
Status: DISCONTINUED | OUTPATIENT
Start: 2025-03-28 | End: 2025-03-30 | Stop reason: HOSPADM

## 2025-03-28 RX ORDER — SODIUM CHLORIDE, SODIUM LACTATE, POTASSIUM CHLORIDE, CALCIUM CHLORIDE 600; 310; 30; 20 MG/100ML; MG/100ML; MG/100ML; MG/100ML
INJECTION, SOLUTION INTRAVENOUS CONTINUOUS
Status: DISPENSED | OUTPATIENT
Start: 2025-03-28 | End: 2025-03-29

## 2025-03-28 RX ORDER — LIDOCAINE 4 G/G
1 PATCH TOPICAL EVERY 24 HOURS
Status: DISCONTINUED | OUTPATIENT
Start: 2025-03-28 | End: 2025-03-30 | Stop reason: HOSPADM

## 2025-03-28 RX ORDER — SODIUM CHLORIDE 0.9 % (FLUSH) 0.9 %
5-40 SYRINGE (ML) INJECTION EVERY 12 HOURS SCHEDULED
Status: DISCONTINUED | OUTPATIENT
Start: 2025-03-28 | End: 2025-03-30 | Stop reason: HOSPADM

## 2025-03-28 RX ORDER — SODIUM CHLORIDE 0.9 % (FLUSH) 0.9 %
5-40 SYRINGE (ML) INJECTION PRN
Status: DISCONTINUED | OUTPATIENT
Start: 2025-03-28 | End: 2025-03-30 | Stop reason: HOSPADM

## 2025-03-28 RX ORDER — SODIUM CHLORIDE 0.9 % (FLUSH) 0.9 %
10 SYRINGE (ML) INJECTION EVERY 12 HOURS SCHEDULED
Status: DISCONTINUED | OUTPATIENT
Start: 2025-03-28 | End: 2025-03-28 | Stop reason: SDUPTHER

## 2025-03-28 RX ORDER — FENTANYL CITRATE 50 UG/ML
INJECTION, SOLUTION INTRAMUSCULAR; INTRAVENOUS
Status: COMPLETED | OUTPATIENT
Start: 2025-03-28 | End: 2025-03-28

## 2025-03-28 RX ORDER — NALOXONE HYDROCHLORIDE 0.4 MG/ML
INJECTION, SOLUTION INTRAMUSCULAR; INTRAVENOUS; SUBCUTANEOUS PRN
Status: ACTIVE | OUTPATIENT
Start: 2025-03-28 | End: 2025-03-29

## 2025-03-28 RX ORDER — ACETAMINOPHEN 325 MG/1
975 TABLET ORAL ONCE
Status: COMPLETED | OUTPATIENT
Start: 2025-03-28 | End: 2025-03-28

## 2025-03-28 RX ORDER — ONDANSETRON 2 MG/ML
4 INJECTION INTRAMUSCULAR; INTRAVENOUS EVERY 6 HOURS PRN
Status: DISCONTINUED | OUTPATIENT
Start: 2025-03-28 | End: 2025-03-28 | Stop reason: SDUPTHER

## 2025-03-28 RX ORDER — TRANEXAMIC ACID 100 MG/ML
INJECTION, SOLUTION INTRAVENOUS
Status: DISCONTINUED | OUTPATIENT
Start: 2025-03-28 | End: 2025-03-28 | Stop reason: SDUPTHER

## 2025-03-28 RX ORDER — IBUPROFEN 400 MG/1
800 TABLET, FILM COATED ORAL EVERY 8 HOURS
Status: DISCONTINUED | OUTPATIENT
Start: 2025-03-29 | End: 2025-03-30 | Stop reason: HOSPADM

## 2025-03-28 RX ORDER — ACETAMINOPHEN 500 MG
1000 TABLET ORAL EVERY 8 HOURS SCHEDULED
Status: DISCONTINUED | OUTPATIENT
Start: 2025-03-28 | End: 2025-03-30 | Stop reason: HOSPADM

## 2025-03-28 RX ORDER — TRANEXAMIC ACID 100 MG/ML
INJECTION, SOLUTION INTRAVENOUS
Status: COMPLETED
Start: 2025-03-28 | End: 2025-03-28

## 2025-03-28 RX ORDER — DEXAMETHASONE SODIUM PHOSPHATE 4 MG/ML
INJECTION, SOLUTION INTRA-ARTICULAR; INTRALESIONAL; INTRAMUSCULAR; INTRAVENOUS; SOFT TISSUE
Status: DISCONTINUED | OUTPATIENT
Start: 2025-03-28 | End: 2025-03-28 | Stop reason: SDUPTHER

## 2025-03-28 RX ORDER — BUPIVACAINE HYDROCHLORIDE 7.5 MG/ML
INJECTION, SOLUTION INTRASPINAL
Status: COMPLETED | OUTPATIENT
Start: 2025-03-28 | End: 2025-03-28

## 2025-03-28 RX ADMIN — DEXAMETHASONE SODIUM PHOSPHATE 4 MG: 4 INJECTION INTRA-ARTICULAR; INTRALESIONAL; INTRAMUSCULAR; INTRAVENOUS; SOFT TISSUE at 10:39

## 2025-03-28 RX ADMIN — FAMOTIDINE 20 MG: 10 INJECTION, SOLUTION INTRAVENOUS at 09:30

## 2025-03-28 RX ADMIN — SODIUM CHLORIDE, SODIUM LACTATE, POTASSIUM CHLORIDE, AND CALCIUM CHLORIDE 1000 ML: .6; .31; .03; .02 INJECTION, SOLUTION INTRAVENOUS at 08:48

## 2025-03-28 RX ADMIN — PHENYLEPHRINE HYDROCHLORIDE 100 MCG: 10 INJECTION INTRAVENOUS at 10:37

## 2025-03-28 RX ADMIN — FENTANYL CITRATE 25 MCG: 50 INJECTION, SOLUTION INTRAMUSCULAR; INTRAVENOUS at 11:08

## 2025-03-28 RX ADMIN — ACETAMINOPHEN 975 MG: 325 TABLET ORAL at 09:29

## 2025-03-28 RX ADMIN — BUPIVACAINE HYDROCHLORIDE WITH DEXTROSE 11 MG: 7.5 INJECTION SUBARACHNOID at 10:10

## 2025-03-28 RX ADMIN — PHENYLEPHRINE HYDROCHLORIDE 100 MCG: 10 INJECTION INTRAVENOUS at 10:55

## 2025-03-28 RX ADMIN — WATER 2000 MG: 1 INJECTION INTRAMUSCULAR; INTRAVENOUS; SUBCUTANEOUS at 09:30

## 2025-03-28 RX ADMIN — NITROGLYCERIN 50 MCG: 20 INJECTION INTRAVENOUS at 10:34

## 2025-03-28 RX ADMIN — HYDROMORPHONE HYDROCHLORIDE 0.5 MG: 1 INJECTION, SOLUTION INTRAMUSCULAR; INTRAVENOUS; SUBCUTANEOUS at 19:00

## 2025-03-28 RX ADMIN — ONDANSETRON 4 MG: 2 INJECTION INTRAMUSCULAR; INTRAVENOUS at 10:08

## 2025-03-28 RX ADMIN — Medication 909 MILLI-UNITS/MIN: at 10:37

## 2025-03-28 RX ADMIN — SODIUM CHLORIDE, POTASSIUM CHLORIDE, SODIUM LACTATE AND CALCIUM CHLORIDE: 600; 310; 30; 20 INJECTION, SOLUTION INTRAVENOUS at 10:31

## 2025-03-28 RX ADMIN — DOCUSATE SODIUM 100 MG: 100 CAPSULE, LIQUID FILLED ORAL at 14:21

## 2025-03-28 RX ADMIN — KETOROLAC TROMETHAMINE 30 MG: 30 INJECTION, SOLUTION INTRAMUSCULAR; INTRAVENOUS at 11:38

## 2025-03-28 RX ADMIN — ONDANSETRON 4 MG: 2 INJECTION, SOLUTION INTRAMUSCULAR; INTRAVENOUS at 18:59

## 2025-03-28 RX ADMIN — MORPHINE SULFATE 0.15 MG: 1 INJECTION EPIDURAL; INTRATHECAL; INTRAVENOUS at 10:10

## 2025-03-28 RX ADMIN — TRANEXAMIC ACID 1000 MG: 100 INJECTION, SOLUTION INTRAVENOUS at 10:26

## 2025-03-28 RX ADMIN — DEXMEDETOMIDINE 10 MCG: 100 INJECTION, SOLUTION INTRAVENOUS at 11:27

## 2025-03-28 RX ADMIN — ALBUMIN (HUMAN) 12.5 G: 12.5 INJECTION, SOLUTION INTRAVENOUS at 10:54

## 2025-03-28 RX ADMIN — PHENYLEPHRINE HYDROCHLORIDE 40 MCG/MIN: 10 INJECTION INTRAVENOUS at 10:10

## 2025-03-28 RX ADMIN — KETOROLAC TROMETHAMINE 30 MG: 30 INJECTION, SOLUTION INTRAMUSCULAR; INTRAVENOUS at 23:01

## 2025-03-28 RX ADMIN — FENTANYL CITRATE 15 MCG: 50 INJECTION INTRAMUSCULAR; INTRAVENOUS at 10:10

## 2025-03-28 RX ADMIN — KETOROLAC TROMETHAMINE 30 MG: 30 INJECTION, SOLUTION INTRAMUSCULAR; INTRAVENOUS at 17:29

## 2025-03-28 ASSESSMENT — PAIN DESCRIPTION - LOCATION
LOCATION: INCISION
LOCATION: INCISION

## 2025-03-28 ASSESSMENT — PAIN DESCRIPTION - ORIENTATION: ORIENTATION: LOWER

## 2025-03-28 ASSESSMENT — PAIN SCALES - GENERAL
PAINLEVEL_OUTOF10: 6
PAINLEVEL_OUTOF10: 3

## 2025-03-28 ASSESSMENT — PAIN - FUNCTIONAL ASSESSMENT: PAIN_FUNCTIONAL_ASSESSMENT: ACTIVITIES ARE NOT PREVENTED

## 2025-03-28 ASSESSMENT — PAIN DESCRIPTION - DESCRIPTORS: DESCRIPTORS: PRESSURE

## 2025-03-28 NOTE — PROGRESS NOTES
TRANSFER - IN REPORT:    Verbal report received from DOMINGO Rivas RN on Cari Singh  being received from L&D for routine progression of patient care      Report consisted of patient's Situation, Background, Assessment and   Recommendations(SBAR).     Information from the following report(s) Nurse Handoff Report was reviewed with the receiving nurse.    Opportunity for questions and clarification was provided.      Assessment completed upon patient's arrival to unit and care assumed.

## 2025-03-28 NOTE — L&D DELIVERY NOTE
Briana Singh [[id number]]      Labor Events     Labor: No   Steroids: None  Cervical Ripening Date/Time:      Antibiotics Received during Labor: No  Rupture Date/Time:      Rupture Type: AROM  Induction: None  Augmentation: None  Labor Complications: Placenta Previa       Anesthesia    Method: Spinal       Delivery Details      Delivery Date: 3/28/25 Delivery Time: 10:37:00   Delivery Type: , Low Transverse  Trial of Labor?: No   Categorization: Primary   Priority: scheduled  Indications for : Placenta Previa       Skin Incision Type: Pfannenstiel  Uterine Incision: T-uterine       [place] Presentation    Presentation: Breech       Shoulder Dystocia    Shoulder Dystocia Present?: No       Assisted Delivery Details    Forceps Attempted?: No  Vacuum Extractor Attempted?: No                           Cord    Vessels: 3 Vessels  Complications: None  Delayed Cord Clamping?: No  Cord Blood Disposition: Lab  Gases Sent?: Yes              Placenta    Date/Time: 3/28/2025 10:38:00  Removal: Expressed  Appearance: Intact  Disposition: Family       Lacerations    Episiotomy: None  Perineal Lacerations: None  Other Lacerations: no non-perineal laceration       Blood Loss  Mother: Cari Singh \"Silvino\" #[id number]     Start of Mother's Information      Delivery Blood Loss   Intrapartum & Postpartum: 25 1005 - 25 1209    Delivery Admission: 25 0806 - 25 1209         Intrapartum & Postpartum Delivery Admission    None                  End of Mother's Information  Mother: Cari Singh \"Silvino\" #[id number]                Delivery Providers    Delivering clinician: Julisa Moirn MD     Provider Role    Julisa Morin MD Obstetrician    Tanisha Arreaga RN Primary Nurse    Elisabet, Destiny B, RN Primary  Nurse    Lesli Zuluaga RN NICU Nurse    Bridgette Lynn MD Neonatologist    Gray Powell, RT Respiratory Therapist

## 2025-03-28 NOTE — ANESTHESIA PRE PROCEDURE
Department of Anesthesiology  Preprocedure Note       Name:  Cari Singh   Age:  39 y.o.  :  1985                                          MRN:  [id number]         Date:  3/28/2025      Surgeon: Surgeon(s):  Julisa Morin MD Johnson, Eric, MD    Procedure: Procedure(s):   SECTION (E R A S)    Medications prior to admission:   Prior to Admission medications    Medication Sig Start Date End Date Taking? Authorizing Provider   vitamin B-1 (THIAMINE) 100 MG tablet Take 1 tablet by mouth daily   Yes Nico Mitchell MD   Prenatal Multivit-Min-Fe-FA (PRENATAL 1 + IRON PO) Take by mouth   Yes Nico Mitchell MD   zinc sulfate (ZINCATE) 220 (50 Zn)  mg capsule - elemental zinc Take 1 capsule by mouth daily   Yes Nico Mitchell MD   ondansetron (ZOFRAN-ODT) 4 MG disintegrating tablet Take 1 tablet by mouth 3 times daily as needed for Nausea or Vomiting 24   Jv Forrester MD   dicyclomine (BENTYL) 20 MG tablet Take 1 tablet by mouth 4 times daily 24   Jv Forrester MD   vitamin C (ASCORBIC ACID) 500 MG tablet Take 1 tablet by mouth daily    Nico Mitchell MD   CREATINE PO Take by mouth    Nico Mitchell MD       Current medications:    Current Facility-Administered Medications   Medication Dose Route Frequency Provider Last Rate Last Admin    lactated ringers infusion   IntraVENous Continuous Julisa Morin MD        sodium chloride flush 0.9 % injection 10 mL  10 mL IntraVENous 2 times per day Julisa Morin MD        sodium chloride flush 0.9 % injection 10 mL  10 mL IntraVENous PRN Julisa Morin MD        0.9 % sodium chloride infusion   IntraVENous PRN Julisa Morin MD        oxytocin (PITOCIN) 30 units in 500 mL infusion  87.3 kinjal-units/min IntraVENous Continuous PRN Julisa Morin  mL/hr at 25 1037 909 kinjal-units/min at 25 1037    And    oxytocin (PITOCIN) 10 unit bolus from the bag  10 Units IntraVENous Julisa Reynoso MD

## 2025-03-28 NOTE — OP NOTE
Operative Note  Patient - Cari Singh  Medical Record Number - [id number]   YOB: 1985      DATE AND TIME OF PROCEDURE: [unfilled]   12:12 PM     PREOPERATIVE DIAGNOSIS: Delivery by  section using transverse incision of lower segment of uterus [O82]  Complete posterior placenta previa   Transverse fetal lie    POSTOPERATIVE DIAGNOSIS: Same but delivered    PROCEDURE(S): Procedure(s):   SECTION (E R A S)     ANESTHESIA: Epidural    SURGEON:  Julisa Morin MD    ASSISTANT: Isaiah Ferrell MD    QUANTITATIVE BLOOD LOSS AT PROCEDURE END: pending,  cc    COMPLICATIONS: T incision made in uterus to facilitate delivery given transverse fetal lie    IMPLANTS: *No implants in the log*    SPECIMENS: None    FINDINGS: Normal uterus, ovaries, fallopian tubes. Female fetus transverse lie with head to maternal right and back up.       Prophylactic Antibiotics: Ancef    DVT Prophylaxis: Sequential Compression Devices         Fetal Description: horton     Birth Information:   Information for the patient's :  Briana Singh [[id number]]   @[id number]@    Umbilical Cord: 3 vessels present and Cord blood sent to lab for type, Rh, and Tricia' test    Placenta:  expressed        Procedure Detail:      After proper patient identification and consent, the patient was taken to the operating room, where spinal anesthesia was administered and found to be adequate. Soriano catheter had been placed using sterile technique.  The patient was prepped and draped in the normal sterile fashion.The abdomen was entered using the Pfannenstiel technique. The peritoneum was entered sharply well superior to the bladder without any apparent injury. A low transverse uterine incision was made with the scalpel and extended with blunt finger dissection. Amniotomy was performed and the fluid was medium amount clear.  Umbilical cord and fetal hand/arm as presenting parts. They were reduced into

## 2025-03-28 NOTE — ANESTHESIA POSTPROCEDURE EVALUATION
Post-Anesthesia Evaluation and Assessment    Patient: Cari Singh MRN: [id number]  SSN: [id number]    YOB: 1985  Age: 39 y.o.  Sex: female      I have evaluated the patient and they are stable and ready for discharge from the PACU.     Cardiovascular Function/Vital Signs  Visit Vitals  /62   Pulse 98   Temp 97.6 °F (36.4 °C) (Axillary)   Resp 25   Ht 1.549 m (5' 0.98\")   Wt 79.8 kg (176 lb)   SpO2 98%   BMI 33.27 kg/m²       Patient is status post Epidural anesthesia for Procedure(s):   SECTION (E R A S).    Nausea/Vomiting: None    Postoperative hydration reviewed and adequate.    Pain:      Managed    Neurological Status:       At baseline    Mental Status, Level of Consciousness: Alert and  oriented to person, place, and time    Pulmonary Status:       Adequate oxygenation and airway patent    Complications related to anesthesia: None    Post-anesthesia assessment completed. No concerns    Signed By: Gustavo Hoang MD     2025

## 2025-03-28 NOTE — ANESTHESIA PROCEDURE NOTES
Spinal Block    Patient location during procedure: OR  End time: 3/28/2025 10:10 AM  Reason for block: primary anesthetic  Staffing  Performed: anesthesiologist   Anesthesiologist: Gustavo Hoang MD  Performed by: Gustavo Hoang MD  Authorized by: Gustavo Hoang MD    Spinal Block  Patient position: sitting  Prep: DuraPrep  Patient monitoring: cardiac monitor, continuous pulse ox, frequent blood pressure checks and oxygen  Approach: midline  Location: L3/L4  Provider prep: mask and sterile gloves  Needle  Needle type: pencil-tip   Needle gauge: 24 G  Needle length: 4 in  Assessment  Sensory level: T4  Events: None  Swirl obtained: Yes  CSF: clear  Attempts: 1  Hemodynamics: stable  Preanesthetic Checklist  Completed: patient identified, IV checked, site marked, risks and benefits discussed, surgical/procedural consents, equipment checked, pre-op evaluation, timeout performed, anesthesia consent given, oxygen available, monitors applied/VS acknowledged and fire risk safety assessment completed and verbalized

## 2025-03-28 NOTE — LACTATION NOTE
This note was copied from a baby's chart.  Early term infant Baby nursing well after delivery, deep latch obtained, mother is comfortable, baby feeding vigorously with rhythmic suck, swallow, breathe pattern, both breasts offered, baby is skin to skin for feeding.   Mother attempted breastfeeding with her others but was not successful.  She hopes to nurse this baby exclusively.

## 2025-03-28 NOTE — PROGRESS NOTES
0800 Pt arrived to LDR 12 for scheduled CS. FOB at bedside.     0845 2 PIV started to R FA.    1045 Dr Morin  and Dr Hoang at bedside.

## 2025-03-28 NOTE — DISCHARGE INSTRUCTIONS
POST DELIVERY DISCHARGE INSTRUCTIONS    Name: Cari Singh  YOB: 1985  Primary Diagnosis: s/p  section    General:     Diet/Diet Restrictions:  Eight 8-ounce glasses of fluid daily (water, juices); avoid excessive caffeine intake.  Meals/snacks as desired which are high in fiber and carbohydrates and low in fat and cholesterol.      Physical Activity / Restrictions / Safety:     Avoid heavy lifting, no more that 8 lbs. For 2-3 weeks;   Limit use of stairs to 2 times daily for the first week home.   No driving for one week.  Avoid intercourse 4-6 weeks, no douching or tampon use.   Check with obstetrician before starting or resuming an exercise program.      Discharge Instructions/Special Treatment/Home Care Needs:     Continue prenatal vitamins.  Continue to use squirt bottle with warm water on your episiotomy after each bathroom use until bleeding stops.  If steri-strips applied to your incision, remove in 7-10 days.    Call your doctor for the following:     Fever over 101 degrees by mouth.  Vaginal bleeding heavier than a normal menstrual period or clots larger than a golf ball.  Red streaks or increased swelling of legs, painful red streaks on your breast.  Painful urination, constipation and increased pain or swelling or discharge with your incision.  If you feel extremely anxious or overwhelmed.  If you have thoughts of harming yourself and/or your baby.    Pain Management:     Take Acetaminophen (Tylenol) or Ibuprofen (Advil, Motrin), as directed for pain.   Use a warm Sitz bath 3 times daily to relieve episiotomy or hemorrhoidal discomfort.   For hemorrhoidal discomfort, use Tucks and Anusol cream as needed and directed.  Heating pad to  incision as needed.     Follow-Up Care:     Appointment with MD: 6 weeks  Telephone number: [phone]    Signed By: Dorina Munguia MD                                                                                                   Date:

## 2025-03-28 NOTE — PROGRESS NOTES
TRANSFER - OUT REPORT:    Verbal and bedside report given to DARLEEN Malik RN on Cari Singh  being transferred to [facility] for routine progression of patient care       Report consisted of patient's Situation, Background, Assessment and   Recommendations(SBAR).     Information from the following report(s) Nurse Handoff Report, Index, Adult Overview, Surgery Report, Intake/Output, MAR, and Recent Results was reviewed with the receiving nurse.           Lines:   Peripheral IV 03/28/25 Proximal;Right Forearm (Active)   Site Assessment Clean, dry & intact 03/28/25 1157       Peripheral IV 03/28/25 Posterior;Proximal;Right Forearm (Active)   Site Assessment Clean, dry & intact 03/28/25 1157        Opportunity for questions and clarification was provided.      Patient transported with:  Registered Nurse

## 2025-03-29 LAB
ERYTHROCYTE [DISTWIDTH] IN BLOOD BY AUTOMATED COUNT: 11.9 % (ref 11.5–14.5)
HCT VFR BLD AUTO: 27.2 % (ref 35–47)
HGB BLD-MCNC: 9.7 G/DL (ref 11.5–16)
MCH RBC QN AUTO: 31.4 PG (ref 26–34)
MCHC RBC AUTO-ENTMCNC: 35.7 G/DL (ref 30–36.5)
MCV RBC AUTO: 88 FL (ref 80–99)
NRBC # BLD: 0 K/UL (ref 0–0.01)
NRBC BLD-RTO: 0 PER 100 WBC
PLATELET # BLD AUTO: 159 K/UL (ref 150–400)
PMV BLD AUTO: 9.7 FL (ref 8.9–12.9)
RBC # BLD AUTO: 3.09 M/UL (ref 3.8–5.2)
WBC # BLD AUTO: 23.1 K/UL (ref 3.6–11)

## 2025-03-29 PROCEDURE — 6360000002 HC RX W HCPCS: Performed by: OBSTETRICS & GYNECOLOGY

## 2025-03-29 PROCEDURE — 1120000000 HC RM PRIVATE OB

## 2025-03-29 PROCEDURE — 6370000000 HC RX 637 (ALT 250 FOR IP): Performed by: OBSTETRICS & GYNECOLOGY

## 2025-03-29 PROCEDURE — 85027 COMPLETE CBC AUTOMATED: CPT

## 2025-03-29 RX ADMIN — IBUPROFEN 800 MG: 400 TABLET, FILM COATED ORAL at 21:53

## 2025-03-29 RX ADMIN — SIMETHICONE 80 MG: 80 TABLET, CHEWABLE ORAL at 17:12

## 2025-03-29 RX ADMIN — Medication: at 22:39

## 2025-03-29 RX ADMIN — DOCUSATE SODIUM 100 MG: 100 CAPSULE, LIQUID FILLED ORAL at 17:12

## 2025-03-29 RX ADMIN — KETOROLAC TROMETHAMINE 30 MG: 30 INJECTION, SOLUTION INTRAMUSCULAR; INTRAVENOUS at 05:28

## 2025-03-29 RX ADMIN — ACETAMINOPHEN 1000 MG: 500 TABLET ORAL at 06:44

## 2025-03-29 RX ADMIN — ACETAMINOPHEN 1000 MG: 500 TABLET ORAL at 14:15

## 2025-03-29 RX ADMIN — SIMETHICONE 80 MG: 80 TABLET, CHEWABLE ORAL at 22:39

## 2025-03-29 RX ADMIN — IBUPROFEN 800 MG: 400 TABLET, FILM COATED ORAL at 11:49

## 2025-03-29 RX ADMIN — DOCUSATE SODIUM 100 MG: 100 CAPSULE, LIQUID FILLED ORAL at 05:33

## 2025-03-29 RX ADMIN — ACETAMINOPHEN 1000 MG: 500 TABLET ORAL at 22:00

## 2025-03-29 ASSESSMENT — PAIN DESCRIPTION - ORIENTATION
ORIENTATION: LOWER
ORIENTATION: LOWER

## 2025-03-29 ASSESSMENT — PAIN DESCRIPTION - DESCRIPTORS
DESCRIPTORS: SORE
DESCRIPTORS: PRESSURE

## 2025-03-29 ASSESSMENT — PAIN SCALES - GENERAL
PAINLEVEL_OUTOF10: 3
PAINLEVEL_OUTOF10: 0

## 2025-03-29 ASSESSMENT — PAIN DESCRIPTION - LOCATION
LOCATION: INCISION
LOCATION: INCISION

## 2025-03-29 ASSESSMENT — PAIN - FUNCTIONAL ASSESSMENT: PAIN_FUNCTIONAL_ASSESSMENT: ACTIVITIES ARE NOT PREVENTED

## 2025-03-29 NOTE — PROGRESS NOTES
0730 - Bedside shift change report given to DOMINGO Pearson RN (oncoming nurse) by OMNET Clay RN (offgoing nurse). Report included the following information Nurse Handoff Report.

## 2025-03-29 NOTE — PROGRESS NOTES
Post-Operative  Day 1    Cari Singh     Assessment:   Post-Op day 1 s/p PLTCS with T incision for posterior placenta previa and transverse lie  Hgb 11.5 -> 9.7, asymptomatic, will go home on Fe  Baby girl    Plan:     - Routine post-operative care.  - Postop hemoglobin stable.  Plan to start Fe at discharge if pt anemic.  - Ambulate today.      Information for the patient's :  Briana Singh [[id number]]   , Low Transverse  Patient doing well without significant complaint.  Tolerating diet.  Soriano out.  Ambulating.      Vitals:  BP 90/60   Pulse 63   Temp 97.3 °F (36.3 °C) (Oral)   Resp 16   Ht 1.549 m (5' 0.98\")   Wt 79.8 kg (176 lb)   LMP 2024 (Approximate)   SpO2 98%   BMI 33.27 kg/m²   Temp (24hrs), Av.1 °F (36.7 °C), Min:97.3 °F (36.3 °C), Max:98.8 °F (37.1 °C)      Last 24hr Input/Output:    Intake/Output Summary (Last 24 hours) at 3/29/2025 0808  Last data filed at 3/28/2025 1832  Gross per 24 hour   Intake 1550 ml   Output 2740 ml   Net -1190 ml          Exam:     Patient without distress.               Fundus firm, nontender per nursing fundal checks.  Incision bandaged, clean, dry, intact.              Perineum with normal lochia noted per nursing assessment.              Lower extremities are negative for pathological edema.    Labs:   Lab Results   Component Value Date/Time    WBC 23.1 2025 04:12 AM    WBC 11.3 2025 08:57 AM    WBC 10.2 2025 10:49 AM    WBC 14.7 2024 04:06 PM    WBC 10.0 2024 12:00 AM    WBC 7.8 2022 11:45 AM    HGB 9.7 2025 04:12 AM    HGB 11.5 2025 08:57 AM    HGB 11.1 2025 10:49 AM    HGB 12.6 2024 04:06 PM    HGB 13.1 2024 12:00 AM    HGB 12.5 2022 11:45 AM    HCT 27.2 2025 04:12 AM    HCT 33.2 2025 08:57 AM    HCT 31.9 2025 10:49 AM    HCT 35.6 2024 04:06 PM    HCT 36.7 2024 12:00 AM    HCT 35.0 2022 11:45 AM    PLT

## 2025-03-29 NOTE — PROGRESS NOTES
Bedside and Verbal shift change report given to nathalia rn (oncoming nurse) by kartik rn (offgoing nurse). Report included the following information Nurse Handoff Report, Intake/Output, MAR, and Recent Results.

## 2025-03-30 ENCOUNTER — APPOINTMENT (OUTPATIENT)
Facility: HOSPITAL | Age: 40
DRG: 540 | End: 2025-03-30
Payer: COMMERCIAL

## 2025-03-30 VITALS
SYSTOLIC BLOOD PRESSURE: 101 MMHG | TEMPERATURE: 97.3 F | BODY MASS INDEX: 33.23 KG/M2 | HEART RATE: 66 BPM | RESPIRATION RATE: 16 BRPM | HEIGHT: 61 IN | WEIGHT: 176 LBS | DIASTOLIC BLOOD PRESSURE: 57 MMHG | OXYGEN SATURATION: 99 %

## 2025-03-30 PROBLEM — Z98.891 S/P CESAREAN SECTION: Status: ACTIVE | Noted: 2025-03-30

## 2025-03-30 LAB
BASOPHILS # BLD: 0.03 K/UL (ref 0–0.1)
BASOPHILS NFR BLD: 0.2 % (ref 0–1)
DIFFERENTIAL METHOD BLD: ABNORMAL
EOSINOPHIL # BLD: 0.1 K/UL (ref 0–0.4)
EOSINOPHIL NFR BLD: 0.6 % (ref 0–7)
ERYTHROCYTE [DISTWIDTH] IN BLOOD BY AUTOMATED COUNT: 12.2 % (ref 11.5–14.5)
HCT VFR BLD AUTO: 26.8 % (ref 35–47)
HGB BLD-MCNC: 9.3 G/DL (ref 11.5–16)
IMM GRANULOCYTES # BLD AUTO: 0.11 K/UL (ref 0–0.04)
IMM GRANULOCYTES NFR BLD AUTO: 0.7 % (ref 0–0.5)
LYMPHOCYTES # BLD: 2.27 K/UL (ref 0.8–3.5)
LYMPHOCYTES NFR BLD: 14.6 % (ref 12–49)
MCH RBC QN AUTO: 31.6 PG (ref 26–34)
MCHC RBC AUTO-ENTMCNC: 34.7 G/DL (ref 30–36.5)
MCV RBC AUTO: 91.2 FL (ref 80–99)
MONOCYTES # BLD: 1.44 K/UL (ref 0–1)
MONOCYTES NFR BLD: 9.3 % (ref 5–13)
NEUTS SEG # BLD: 11.57 K/UL (ref 1.8–8)
NEUTS SEG NFR BLD: 74.6 % (ref 32–75)
NRBC # BLD: 0 K/UL (ref 0–0.01)
NRBC BLD-RTO: 0 PER 100 WBC
PLATELET # BLD AUTO: 193 K/UL (ref 150–400)
PMV BLD AUTO: 9.9 FL (ref 8.9–12.9)
RBC # BLD AUTO: 2.94 M/UL (ref 3.8–5.2)
WBC # BLD AUTO: 15.5 K/UL (ref 3.6–11)

## 2025-03-30 PROCEDURE — 6370000000 HC RX 637 (ALT 250 FOR IP): Performed by: OBSTETRICS & GYNECOLOGY

## 2025-03-30 PROCEDURE — 74177 CT ABD & PELVIS W/CONTRAST: CPT

## 2025-03-30 PROCEDURE — 6360000004 HC RX CONTRAST MEDICATION: Performed by: OBSTETRICS & GYNECOLOGY

## 2025-03-30 PROCEDURE — 85025 COMPLETE CBC W/AUTO DIFF WBC: CPT

## 2025-03-30 RX ORDER — IBUPROFEN 800 MG/1
800 TABLET, FILM COATED ORAL EVERY 8 HOURS PRN
Qty: 60 TABLET | Refills: 1 | Status: SHIPPED | OUTPATIENT
Start: 2025-03-30

## 2025-03-30 RX ORDER — IOPAMIDOL 755 MG/ML
100 INJECTION, SOLUTION INTRAVASCULAR
Status: COMPLETED | OUTPATIENT
Start: 2025-03-30 | End: 2025-03-30

## 2025-03-30 RX ORDER — PSEUDOEPHEDRINE HCL 30 MG
100 TABLET ORAL 2 TIMES DAILY PRN
Qty: 60 CAPSULE | Refills: 1 | Status: SHIPPED | OUTPATIENT
Start: 2025-03-30

## 2025-03-30 RX ORDER — FERROUS SULFATE 325(65) MG
325 TABLET ORAL
Qty: 60 TABLET | Refills: 0 | Status: SHIPPED | OUTPATIENT
Start: 2025-03-30

## 2025-03-30 RX ORDER — OXYCODONE HYDROCHLORIDE 5 MG/1
5 TABLET ORAL EVERY 8 HOURS PRN
Qty: 18 TABLET | Refills: 0 | Status: SHIPPED | OUTPATIENT
Start: 2025-03-30 | End: 2025-04-06

## 2025-03-30 RX ADMIN — ACETAMINOPHEN 1000 MG: 500 TABLET ORAL at 16:25

## 2025-03-30 RX ADMIN — DOCUSATE SODIUM 100 MG: 100 CAPSULE, LIQUID FILLED ORAL at 08:21

## 2025-03-30 RX ADMIN — IBUPROFEN 800 MG: 400 TABLET, FILM COATED ORAL at 08:21

## 2025-03-30 RX ADMIN — ACETAMINOPHEN 1000 MG: 500 TABLET ORAL at 08:20

## 2025-03-30 RX ADMIN — IBUPROFEN 800 MG: 400 TABLET, FILM COATED ORAL at 16:25

## 2025-03-30 RX ADMIN — IOPAMIDOL 100 ML: 755 INJECTION, SOLUTION INTRAVENOUS at 16:11

## 2025-03-30 RX ADMIN — SIMETHICONE 80 MG: 80 TABLET, CHEWABLE ORAL at 08:20

## 2025-03-30 ASSESSMENT — PAIN SCALES - GENERAL
PAINLEVEL_OUTOF10: 2
PAINLEVEL_OUTOF10: 3

## 2025-03-30 ASSESSMENT — PAIN DESCRIPTION - DESCRIPTORS
DESCRIPTORS: SORE
DESCRIPTORS: SORE

## 2025-03-30 ASSESSMENT — PAIN DESCRIPTION - LOCATION
LOCATION: INCISION
LOCATION: INCISION

## 2025-03-30 ASSESSMENT — PAIN DESCRIPTION - ORIENTATION
ORIENTATION: LOWER
ORIENTATION: LOWER

## 2025-03-30 NOTE — PROGRESS NOTES
1929: Bedside and Verbal shift change report given to ARIC Negron RN  (oncoming nurse) by DOMINGO Pearson RN  (offgoing nurse). Report included the following information Nurse Handoff Report, Surgery Report, Intake/Output, MAR, and Recent Results.

## 2025-03-30 NOTE — DISCHARGE SUMMARY
Obstetrical Discharge Summary     Name: Cari Singh MRN: [id number]  SSN: [id number]    YOB: 1985  Age: 39 y.o.  Sex: female      Admit Date: 3/28/2025    Discharge Date: 3/30/2025    Admitting Physician: Julisa Morin MD     Attending Physician:  Julisa Morin MD     Admission Diagnoses: Delivery by  section using transverse incision of lower segment of uterus [O82]  37 weeks gestation of pregnancy [Z3A.37]    Discharge Diagnoses:   Information for the patient's :  Briana Singh [[id number]]   @[phone]@     Additional Diagnoses:  No components found for: \"OBEXTABORH\", \"OBEXTABSCRN\", \"OBEXTRUBELLA\", \"OBEXTGRBS\"    Hospital Course: Normal hospital course following the delivery.    Patient Instructions:   Current Discharge Medication List        START taking these medications    Details   docusate sodium (COLACE, DULCOLAX) 100 MG CAPS Take 100 mg by mouth 2 times daily as needed for Constipation  Qty: 60 capsule, Refills: 1      ibuprofen (ADVIL;MOTRIN) 800 MG tablet Take 1 tablet by mouth every 8 hours as needed for Pain  Qty: 60 tablet, Refills: 1      oxyCODONE (ROXICODONE) 5 MG immediate release tablet Take 1 tablet by mouth every 8 hours as needed for Pain for up to 7 days. Max Daily Amount: 15 mg  Qty: 18 tablet, Refills: 0    Comments: Reduce doses taken as pain becomes manageable  Associated Diagnoses: S/P  section      ferrous sulfate (IRON 325) 325 (65 Fe) MG tablet Take 1 tablet by mouth daily (with breakfast)  Qty: 60 tablet, Refills: 0           CONTINUE these medications which have NOT CHANGED    Details   Prenatal Multivit-Min-Fe-FA (PRENATAL 1 + IRON PO) Take by mouth           STOP taking these medications       vitamin B-1 (THIAMINE) 100 MG tablet Comments:   Reason for Stopping:         ondansetron (ZOFRAN-ODT) 4 MG disintegrating tablet Comments:   Reason for Stopping:         dicyclomine (BENTYL) 20 MG tablet Comments:   Reason for Stopping:

## 2025-03-30 NOTE — PROGRESS NOTES
CTSP due to Umbilical Swelling noted today  On exam marked swelling noted and exquisitely tender  No n/v/f/c  Concern for hernia?  Prior wbc 23K will repeat  Will Get Ct scan

## 2025-03-30 NOTE — PROGRESS NOTES
Called to see patient by RN prior to d/c. Patient noticed a bump under her umbilicus this morning and it was tender and wants to have it looked at before she goes home. In room to see patient. Per patient doing well. Pain has been well controlled. Has been ambulating without dizziness/lightheadedness. +Flatus. No BM yet. Tolerating PO.     A&O x3  Breathing easy and unlabored  Incision C/D/I  Indurated, tender area around umbilicus  Lochia normal     POD2 s/p PLTCS with T incision for posterior placenta previa and transverse lie   Indurated area around umbilicus    C/w Dr. Ferrell and he will go assess

## 2025-03-30 NOTE — LACTATION NOTE
This note was copied from a baby's chart.  Mother states nipples are very sore. Carlos Concepcion's ordered. Mother states that baby is nursing well. Baby nursing well today,  deep latch obtained, mother is comfortable, baby feeding vigorously with rhythmic suck, swallow, breathe pattern, audible swallowing, and evident milk transfer, both breasts offered, baby is asleep following feeding. Set mother up with a hospital grade pump for some extra stimulation to the breasts. 24 hour weight loss for baby was 3.98%.

## 2025-03-30 NOTE — PROGRESS NOTES
Post-Operative  Day 2    Cari Singh     Assessment:   Post-Op day 2 s/p PLTCS with T incision for posterior placenta previa and transverse lie  Hgb 11.5 -> 9.7, asymptomatic, will go home on Fe  Baby girl    Plan:   - Routine post-operative care.  - Plan for discharge Tomorrow.    Information for the patient's :  Briana Singh [[id number]]   , Low Transverse  Patient doing well without significant complaint. Tolerating regular diet.  Ambulating.  Voiding without difficulty.    Vitals:  BP (!) 96/55 Comment: asymptomatic, per patient this is her \"normal\"  Pulse 79   Temp 97.7 °F (36.5 °C) (Oral)   Resp 14   Ht 1.549 m (5' 0.98\")   Wt 79.8 kg (176 lb)   LMP 2024 (Approximate)   SpO2 98%   Breastfeeding Unknown   BMI 33.27 kg/m²   Temp (24hrs), Av.8 °F (36.6 °C), Min:97.7 °F (36.5 °C), Max:97.9 °F (36.6 °C)        Exam:       Patient without distress.                 Fundus firm, nontender per nursing fundal checks.     Incision bandaged. Clean, dry, intact.                Perineum with normal lochia noted per nursing assessment.                Lower extremities are negative for pathological edema.    Labs:   Lab Results   Component Value Date/Time    WBC 23.1 2025 04:12 AM    WBC 11.3 2025 08:57 AM    WBC 10.2 2025 10:49 AM    WBC 14.7 2024 04:06 PM    WBC 10.0 2024 12:00 AM    WBC 7.8 2022 11:45 AM    HGB 9.7 2025 04:12 AM    HGB 11.5 2025 08:57 AM    HGB 11.1 2025 10:49 AM    HGB 12.6 2024 04:06 PM    HGB 13.1 2024 12:00 AM    HGB 12.5 2022 11:45 AM    HCT 27.2 2025 04:12 AM    HCT 33.2 2025 08:57 AM    HCT 31.9 2025 10:49 AM    HCT 35.6 2024 04:06 PM    HCT 36.7 2024 12:00 AM    HCT 35.0 2022 11:45 AM     2025 04:12 AM     2025 08:57 AM     2025 10:49 AM     2024 04:06 PM     2024

## 2025-03-30 NOTE — PROGRESS NOTES
0730 - Bedside shift change report given to DOMINGO Pearson RN (oncoming nurse) by ARIC Negron RN (offgoing nurse). Report included the following information Nurse Handoff Report.      1334 - patient complaining of tenderness and swelling around the umbilicus. BRIGITTE Ybarra and MD Ferrell in to assess. CBC and CT scan ordered.     1700 - Patient okay to d/c per MD Ferrell.

## (undated) DEVICE — ATTACHMENT SMK 3/8INX10FT VALLEYLAB

## (undated) DEVICE — DRESSING SIL W4XL5IN ANTIBACT GELLING FBR CYTOFORM

## (undated) DEVICE — DEVON™ KNEE AND BODY STRAP 60" X 3" (1.5 M X 7.6 CM): Brand: DEVON

## (undated) DEVICE — CATHETER URIN 16FR 30CC BLLN 2 W F LUBRI-SIL IC

## (undated) DEVICE — 3000CC GUARDIAN II: Brand: GUARDIAN

## (undated) DEVICE — KENDALL SCD EXPRESS SLEEVES, KNEE LENGTH, MEDIUM: Brand: KENDALL SCD

## (undated) DEVICE — TOWEL,OR,DSP,ST,BLUE,STD,2/PK,40PK/CS: Brand: MEDLINE

## (undated) DEVICE — ELECTRODE PT RET AD L9FT HI MOIST COND ADH HYDRGEL CORDED

## (undated) DEVICE — SUTURE VICRYL + SZ 0 L36IN ABSRB VLT L40MM CT 1/2 CIR TAPR VCP358H

## (undated) DEVICE — SUTURE MONOCRYL + SZ 3-0 L36IN ABSRB UD CT-1 L36MM 1/2 CIR MCP944H

## (undated) DEVICE — SOLUTION IRRIG 1000ML 0.9% SOD CHL USP POUR PLAS BTL

## (undated) DEVICE — APPLICATOR MEDICATED 26 CC SOLUTION HI LT ORNG CHLORAPREP

## (undated) DEVICE — PAD,ABDOMINAL,5"X9",ST,LF,25/BX: Brand: MEDLINE INDUSTRIES, INC.

## (undated) DEVICE — PENCIL SMK EVAC 10 FT BLADE ELECTRD ROCKER FOR TELSCP

## (undated) DEVICE — SOLIDIFIER FLD 3.2OZ 3000CC TRAD IN BTL LIQUI-LOC

## (undated) DEVICE — SOLIDIFIER FLUID 1.3 OZ GEL 1200CC NS

## (undated) DEVICE — C SECTION PACK: Brand: CARDINAL HEALTH